# Patient Record
Sex: FEMALE | Race: WHITE | NOT HISPANIC OR LATINO | Employment: OTHER | URBAN - METROPOLITAN AREA
[De-identification: names, ages, dates, MRNs, and addresses within clinical notes are randomized per-mention and may not be internally consistent; named-entity substitution may affect disease eponyms.]

---

## 2023-01-23 ENCOUNTER — TELEPHONE (OUTPATIENT)
Dept: HEALTH INFORMATION MANAGEMENT | Facility: OTHER | Age: 58
End: 2023-01-23
Payer: COMMERCIAL

## 2023-04-24 ENCOUNTER — HOSPITAL ENCOUNTER (OUTPATIENT)
Facility: MEDICAL CENTER | Age: 58
End: 2023-04-24
Attending: PAIN MEDICINE | Admitting: PAIN MEDICINE
Payer: COMMERCIAL

## 2023-05-03 ENCOUNTER — APPOINTMENT (OUTPATIENT)
Dept: ADMISSIONS | Facility: MEDICAL CENTER | Age: 58
End: 2023-05-03
Attending: PAIN MEDICINE
Payer: COMMERCIAL

## 2023-05-05 ENCOUNTER — PRE-ADMISSION TESTING (OUTPATIENT)
Dept: ADMISSIONS | Facility: MEDICAL CENTER | Age: 58
End: 2023-05-05
Attending: PAIN MEDICINE
Payer: COMMERCIAL

## 2023-05-05 VITALS — HEIGHT: 64 IN | BODY MASS INDEX: 40.85 KG/M2

## 2023-05-05 DIAGNOSIS — Z01.810 PRE-OPERATIVE CARDIOVASCULAR EXAMINATION: ICD-10-CM

## 2023-05-05 LAB — EKG IMPRESSION: NORMAL

## 2023-05-05 PROCEDURE — 83036 HEMOGLOBIN GLYCOSYLATED A1C: CPT

## 2023-05-05 PROCEDURE — 93010 ELECTROCARDIOGRAM REPORT: CPT | Performed by: INTERNAL MEDICINE

## 2023-05-05 PROCEDURE — 36415 COLL VENOUS BLD VENIPUNCTURE: CPT

## 2023-05-05 PROCEDURE — 93005 ELECTROCARDIOGRAM TRACING: CPT

## 2023-05-05 RX ORDER — ATORVASTATIN CALCIUM 40 MG/1
40 TABLET, FILM COATED ORAL
COMMUNITY
Start: 2023-02-09

## 2023-05-05 RX ORDER — VENLAFAXINE HYDROCHLORIDE 75 MG/1
1 CAPSULE, EXTENDED RELEASE ORAL
COMMUNITY

## 2023-05-05 RX ORDER — METHYLPREDNISOLONE 4 MG/1
TABLET ORAL
COMMUNITY
Start: 2022-11-17 | End: 2023-06-16

## 2023-05-05 RX ORDER — GABAPENTIN 300 MG/1
300 CAPSULE ORAL
COMMUNITY

## 2023-05-05 RX ORDER — OMEPRAZOLE 20 MG/1
CAPSULE, DELAYED RELEASE ORAL
COMMUNITY

## 2023-05-05 RX ORDER — ALBUTEROL SULFATE 2.5 MG/3ML
SOLUTION RESPIRATORY (INHALATION)
COMMUNITY
Start: 2022-11-22

## 2023-05-05 RX ORDER — ALPRAZOLAM 0.5 MG/1
0.5 TABLET ORAL
COMMUNITY
Start: 2023-04-08

## 2023-05-05 RX ORDER — METFORMIN HYDROCHLORIDE 500 MG/1
TABLET, EXTENDED RELEASE ORAL
COMMUNITY

## 2023-05-05 RX ORDER — LUMATEPERONE 42 MG/1
CAPSULE ORAL
COMMUNITY

## 2023-05-05 RX ORDER — PREDNISONE 10 MG/1
TABLET ORAL
COMMUNITY
Start: 2022-11-22 | End: 2023-06-16

## 2023-05-05 RX ORDER — LAMOTRIGINE 150 MG/1
TABLET ORAL
COMMUNITY

## 2023-05-05 RX ORDER — IBUPROFEN 600 MG/1
TABLET ORAL
COMMUNITY
End: 2023-06-16

## 2023-05-05 NOTE — PREPROCEDURE INSTRUCTIONS
Pre-admit telephone appointment completed. Pt states all instructions given are understood. Medications the patient will take the morning of surgery per anesthesia protocol:  Gabapentin, Lamictal, Montelukast, Prilosec, Venlafaxine, Zyrtec. Pt given instructions for other prescribed medications per protocol.    Denies anesthesia complications  Will bring CPAP

## 2023-05-06 LAB
EST. AVERAGE GLUCOSE BLD GHB EST-MCNC: 143 MG/DL
HBA1C MFR BLD: 6.6 % (ref 4–5.6)

## 2023-05-06 NOTE — OR NURSING
Abnormal unconfirmed ekg, called pt and she denies any cardiac symptoms, no chest pain, no chest pressure, numbness, tingling.  Pt aware if she did have cardiac symptoms to go to the ER and she verbalizes understanding

## 2023-06-08 ENCOUNTER — TELEPHONE (OUTPATIENT)
Dept: NEUROLOGY | Facility: MEDICAL CENTER | Age: 58
End: 2023-06-08
Payer: COMMERCIAL

## 2023-06-08 NOTE — TELEPHONE ENCOUNTER
NEUROLOGY PATIENT PRE-VISIT PLANNING     Patient was NOT contacted to complete PVP.  Note: Patient will not be contacted if there is no indication to call.     Patient Appointment is scheduled as: New Patient     Is visit type and length scheduled correctly? yes    [employer] Patient is checked in Patient Demographics? Yes    3.   Is referral attached to visit? Yes    4. Were records received from referring provider? Yes    4. Patient was NOT contacted to have someone accompany them to visit.     5. Is this appointment scheduled as a Hospital Follow-Up?  No    6. Does the patient require any pre procedure or post procedure follow up? No    7. If any orders were placed at last visit or intended to be done for this visit do we have Results/Consult Notes? No  Labs - Labs were not ordered at last office visit.  Imaging - Imaging was not ordered at last office visit.  Referrals - No referrals were ordered at last office visit.  Note: If patient appointment is for lab or imaging review and patient did not complete the studies, check with provider if OK to reschedule patient until completed.    8. If patient appointment is for Botox - is order pended for provider? N/A

## 2023-06-16 ENCOUNTER — OFFICE VISIT (OUTPATIENT)
Dept: NEUROLOGY | Facility: MEDICAL CENTER | Age: 58
End: 2023-06-16
Attending: PSYCHIATRY & NEUROLOGY
Payer: COMMERCIAL

## 2023-06-16 VITALS
HEART RATE: 87 BPM | HEIGHT: 64 IN | OXYGEN SATURATION: 92 % | RESPIRATION RATE: 18 BRPM | TEMPERATURE: 97.2 F | DIASTOLIC BLOOD PRESSURE: 72 MMHG | BODY MASS INDEX: 37.71 KG/M2 | SYSTOLIC BLOOD PRESSURE: 128 MMHG | WEIGHT: 220.9 LBS

## 2023-06-16 DIAGNOSIS — E66.9 OBESITY (BMI 30-39.9): ICD-10-CM

## 2023-06-16 DIAGNOSIS — G47.33 OBSTRUCTIVE SLEEP APNEA: ICD-10-CM

## 2023-06-16 DIAGNOSIS — G31.84 AMNESTIC MCI (MILD COGNITIVE IMPAIRMENT WITH MEMORY LOSS): Primary | ICD-10-CM

## 2023-06-16 PROCEDURE — 3074F SYST BP LT 130 MM HG: CPT | Performed by: PSYCHIATRY & NEUROLOGY

## 2023-06-16 PROCEDURE — 99202 OFFICE O/P NEW SF 15 MIN: CPT | Performed by: PSYCHIATRY & NEUROLOGY

## 2023-06-16 PROCEDURE — 3078F DIAST BP <80 MM HG: CPT | Performed by: PSYCHIATRY & NEUROLOGY

## 2023-06-16 PROCEDURE — 99205 OFFICE O/P NEW HI 60 MIN: CPT | Performed by: PSYCHIATRY & NEUROLOGY

## 2023-06-16 ASSESSMENT — PATIENT HEALTH QUESTIONNAIRE - PHQ9: CLINICAL INTERPRETATION OF PHQ2 SCORE: 0

## 2023-06-16 NOTE — PROGRESS NOTES
"Reason for Neurology Consult: chronic memory disturbance(s)    History of present illness:    Jayashree Helton 57 y.o. right handed woman who is from [facility]. She graduated from  and nearly 2 years of college classes. She works to for the [place] [employer]> . She was there for nearly 9 months or so.    Problem List reviewed.    She noticed for the last 4-5 years to have difficulty with short term memory. She was asking questions repetitively at work and she recalls having to ask for further clarity within 5-10 minutes and occurring about 3-4 times per week. She recalled asking a question and would not remember she asked the question ( about 1 day later).  Over the last 1.5 years the above gotten worse to the point she has difficulty remembering conversations with her  or her boss. She has noticed that it's harder to spell certain words (random words)> when typing up a power point. She will have to use Paty to say \"How to spell X word.\" (Both simple and complex).    Reading- sometimes has problems with words and she can read a book 4-5 times and not remember what she read within what she read. This can include even watching TV shows or movies.    Her  gave additional information about 20 years ago and had an asthma attack and clinically coded (for over 30 minutes when in the hospital)  and she was in a mentally induced coma had minor memory issues over the next years- conversations discussed were not retained completely or incompletely after mainly.  Over the last 4-5 years he was agreeing that Bárbar[place] would repeat comments after told to her within 10 minutes and occurring 2-3 times per week.   He has noticed that she can repeat herself multiple times within 30 minutes.     No significant misplacing of items (personal).  She is able to use her I phone, computer, and electronic devices at home.   She is able to manipulate computer screens in " the last 6-12 months.    No involuntary movements of body or head in the last 6 months.    Intellectual ability- reasoning and judgement seems good to her.    No paranoia,delusions or hallucinations in the last 6-12 months.    Average sleep- 7-8 hours most nights in last 3-6 months. No REM Sleep Behavioral symptoms reported or commented upon.    No history of seizure(s),stroke event(s) or concussive events in adult life.    Driving has been unaffected per patient and  in the last 6-12 months without accidents or incidents.      Smoked to 1/2 to 1 pack per day (x 6 years total)- over 20 years ago.  Infrequently alcohol use in adult life.    Family Hx: Adopted.      Patient Active Problem List    Diagnosis Date Noted    Asthma 2014    Depression 2014    Dehydration 2014    Leukocytosis 2014    Nausea vomiting and diarrhea 2014       Past medical history:   Past Medical History:   Diagnosis Date    ASTHMA     Cataract 2023    Depression     Diabetes (HCC) 2017    Hiatus hernia syndrome 2015    Sleep apnea 2015    Snoring        Past surgical history:   Past Surgical History:   Procedure Laterality Date    GYN SURGERY          OTHER      tonsilectomy    OTHER      anal surgery    OTHER ABDOMINAL SURGERY      GI surgery for GERD         Social history:   Social History     Socioeconomic History    Marital status:      Spouse name: Not on file    Number of children: Not on file    Years of education: Not on file    Highest education level: Not on file   Occupational History    Not on file   Tobacco Use    Smoking status: Former     Packs/day: 1.00     Years: 5.00     Pack years: 5.00     Types: Cigarettes     Start date: 1980     Quit date: 1985     Years since quittin.5    Smokeless tobacco: Not on file   Vaping Use    Vaping Use: Never used   Substance and Sexual Activity    Alcohol use: Yes     Alcohol/week: 0.6 oz     Types: 1 Standard  "drinks or equivalent per week     Comment: Drink only once in awhile    Drug use: Not Currently     Types: Inhaled    Sexual activity: Not on file   Other Topics Concern    Not on file   Social History Narrative    Not on file     Social Determinants of Health     Financial Resource Strain: Not on file   Food Insecurity: Not on file   Transportation Needs: Not on file   Physical Activity: Not on file   Stress: Not on file   Social Connections: Not on file   Intimate Partner Violence: Not on file   Housing Stability: Not on file       Family history:   No family history on file.      Current medications:   Current Outpatient Medications   Medication    albuterol (PROVENTIL) 2.5mg/3ml Nebu Soln solution for nebulization    ALPRAZolam (XANAX) 0.5 MG Tab    atorvastatin (LIPITOR) 40 MG Tab    gabapentin (NEURONTIN) 300 MG Cap    lamotrigine (LAMICTAL) 150 MG tablet    Lumateperone Tosylate (CAPLYTA) 42 MG Cap    metFORMIN ER (GLUCOPHAGE XR) 500 MG TABLET SR 24 HR    omeprazole (PRILOSEC) 20 MG delayed-release capsule    venlafaxine XR (EFFEXOR XR) 75 MG CAPSULE SR 24 HR    ondansetron (ZOFRAN ODT) 4 MG TBDP    montelukast (SINGULAIR) 10 MG TABS    ZYRTEC PO    ibuprofen (MOTRIN) 600 MG Tab    methylPREDNISolone (MEDROL DOSEPAK) 4 MG Tablet Therapy Pack    predniSONE (DELTASONE) 10 MG Tab    meloxicam (MOBIC) 15 MG tablet     No current facility-administered medications for this visit.       Medication Allergy:  Allergies   Allergen Reactions    Acetaminophen     Ativan     Codeine     Theophylline     Vicodin [Hydrocodone-Acetaminophen] Vomiting    Latex Rash           Physical examination:   Vitals:    06/16/23 0751   BP: 128/72   BP Location: Right arm   Patient Position: Sitting   BP Cuff Size: Adult   Pulse: 87   Resp: 18   Temp: 36.2 °C (97.2 °F)   TempSrc: Temporal   SpO2: 92%   Weight: 100 kg (220 lb 14.4 oz)   Height: 1.626 m (5' 4\")       Normal cephalic atraumatic.  There is full range of movement around the " neck in all directions without restrictions or discrete pain evoked triggers.  No lower extremity edema.      Neurological  Exam:      Fahad Cognitive Assessment (MOCA) Version 7.1    Years of Education: 1 year college    TOTAL SCORE: 25/30  (to be scanned into the MEDIA section in the E.M.R.)          Mental status: Awake, alert and fully oriented to person, place, time, and situation. Normal attention, concentration, and fund of knowledge for education level.  Did not appear/act combative,irritable,anxious,paranoid/delusional or aggressive to or with me.    Speech and language: Speech is fluent without errors, clear, intact to repetition, and intact to naming.     Follows 3 step motor commands in sequence without significant delay and correctly.    Cranial nerve exam:  II: Pupils are equally round and reactive to light. Visual fields are intact by confrontation.  III, IV, VI: EOMI, no diplopia, no ptosis.  V: Sensation to light touch is normal over V1-3 distributions bilaterally.  .  VII: Facial movements are symmetrical. There is no facial droop. .  VIII: Hearing intact to soft speech and finger rub bilaterally  IX: Palate elevates symmetrically, uvula is midline. Dysarthria is not present.  XI: Shoulder shrug are symmetrical and strong.   XII: Tongue protrudes midline.      Motor exam:  Muscle tone is normal in all 4 limbs. and No abnormal movements appreciated.    Muscle strength:    Neck Flexors/Extensors: 5/5       Right  Left  Deltoid   5/5  5/5      Biceps   5/5  5/5  Triceps              5/5  5/5   Wrist extensors 5/5  5/5  Wrist flexors  5/5  5/5     5/5  5/5  Interossei  5/5  5/5  Thenar (APB)  5/5  5/5   Hip flexors  5/5  5/5  Quadriceps  5/5  5/5    Hamstrings  5/5  5/5  Dorsiflexors  5/5  5/5  Plantarflexors  5/5  5/5  Toe extension  5/5  5/5      Sensory exam:    Vibratory- 8 seconds at great toes, 12 seconds at the ankles bilaterally.  Proprioception: Normal at great toes.      Reflexes:   "     Right  Left  Biceps   2/4  2/4  Triceps              2/4  2/4  Brachioradialis             2/4  2/4  Knee jerk  2/4  2/4  Ankle jerk  2/4  2/4     Frontal release signs are absent    bilaterally toes are downgoing to plantar stimulation..    Coordination (finger-to-nose, heel/knee/shin, rapid alternating movements) was normal.     There was no ataxia, no tremors, and no dysmetria.     Station and gait were normal. Easily stands up from exam chair without retropulsion,veering,leaning,swaying (to either side).       Labs and Tests:     NEUROIMAGING:     Component Ref Range & Units 1 mo ago   Glycohemoglobin 4.0 - 5.6 % 6.6 High     Comment: Increased risk for diabetes:  5.7 -6.4%   Diabetes:  >6.4%   Glycemic control for adults with diabetes:  <7.0%     The above interpretations are per ADA guidelines.  Diagnosis   of diabetes mellitus on the basis of elevated Hemoglobin A1c   should be confirmed by repeating the Hb A1c test.    Est Avg Glucose mg/dL 143    Comment: The eAG calculation is based on the A1c-Derived Daily Glucose   (ADAG) study.  See the ADA's website for additional information.    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    No features of a progressive gait disorder/parkinsonism.    No psychotic or psychiatric features to this presentation.    There is no history of autoimmune disorders or discrete cerebrovascular risk factors, HIV risk factors or significant alcohol use.        Today, I reviewed the clinical criteria and reviewed several  scenarios of the differences being using the medical terms of normal brain aging (age associated memory impairment),  Mild Cognitive Impairment (MCI) and Dementia.    MCI is a syndrome but statistically and for the majority of patients  occurs due  to a more rapid " "aging of the brain tissue or potentially from injury to certain parts of one's brain ( often from such contributing factors as  the cumulative effects of alcohol, from one or more ischemic or hemmorhagic stroke(s), from neurodegeneration or long standing with/without suboptimally controlled Hypertension). It is for some of these potential factors as to why I recommend a brain imaging test (Head CT or Brain MRI) be done for the 1st time or in certain circumstances repeated for comparison purposes  as such imaging can suggest one or more factors as to the reason(s) for the person's cognitive/memory changes. In fact, a normal or \"age related\" finding on a brain imaging test in and of itself is useful clinical and objective information to have in the evaluation of a person who has either an acute, evolving or even chronic (months to years) long cognitive/memory complaint.    Additional factors or contributors to Brain Health issues can be summarized in several books/references which I discussed as well today.     Goals going forwards include:    A. Paying attention to one's risk factors and reducing their prevalence or potential impact on one's changing memory/thinking> an excellent example would be to stop smoking, reduce or eliminate alcohol use (depending on the amount and frequency of usage), maintain good blood pressure control by buying a digital arm blood pressure cuff such as an OMRON Series 3 or 5 and checking one's blood pressure atleast 3 days per week (in the am and early afternoon) that the numbers are under 140/90 and working as needed with the primary care doctor  to optimize blood pressure control).      B. Encouraging proper sleep hygiene which for most adults is 7 to 8 hours of uninterrupted sleep per night.      C. Encouraging some form of exercise preferable aerobic forms to be done (4 to 5 days per week- 30 minutes minimum per day)> 150 minutes per week as a goal. Example activities could include " fast walking (up a slight incline), jogging, cycling (road or stationary), swimming,tennis. Essentially, even basic walking on a flat surface or a treadmill would be better than doing nothing.    D. Maintaining or forming new social contacts with family and friends  and a positive attitude despite the concerns and/or ongoing issues with thinking and/or memory.    E. Eating well which means a diet low in salt  (under 2 grams per day), sugar and saturated fat.    G. Importance of Diet and Exercise encouraged today including MIND Diet.    H. I am keeping up with editorials and  comments from  many academic neurologists throughout the [place] who are writing reviews and summaries of the present state of this provisionally approved anti amyloid compound (aducanumab) and Leqembi.      Based on the critique of the data so far,  there are clear risks of using these compounds including the cumulative risks of microfibrosis of the cortical brain tissue from the effects of the inflammatory removal of amyloid , the risks of potentially giving or needing to get an acute clot busting medication (like Teneceplase) to treat an acute ischemic stroke in evolution and the longer term risks of spontaneous brain bleeds and brain swelling (some of which can be life threatening events).    I have discussed with the patient and family today that given  the great controversy about the study's data and analysis of the lack of clinical  efficacy to this point in time, I feel that I need to wait and see reasonable post marketing data and/or more robust efficacy data supported by the academic community and/or the American Academy of Neurology  before making a commitment to prescribe such a compound(s)  and  where there is more than  a minor/minimal amount of risk to the patient involved in being administered this compound on a chronic (monthly) basis.      I.  Blood work to be done (Vitamin B levels, Vitamin D levels.    J Brain MRI to be done; see  no reason for immediate Brain PET at this time which we discussed.  See no reason for CSF study or EEG at this time.    K. Encouraged CPAP usage in the long term for EDWARD.    L. Encouraged weight reduction to under BMI of 30.0    I have performed  a history and physical exam and a directed /focused  ROS today.    Total time spent today or this patient's care was 68 minutes  and included reviewing  the diagnostic workup to date (such as labs and imaging as well as interpreting such tests relevant to this patient's neurological condition),  reviewing/obtaining separately obtained history (from patient and/or accompanying ffamily member)  for today's neurological problem(s) ,counseling and educating the patient and family member on issues related to cognition/memory and cognitive health factors and documenting  the clinical information in the EMR.    Follow up in about 6 months or so.        Rafal Sanchez MD  [place] [facility].   [facility]

## 2023-12-15 ENCOUNTER — APPOINTMENT (OUTPATIENT)
Dept: NEUROLOGY | Facility: MEDICAL CENTER | Age: 58
End: 2023-12-15
Attending: PSYCHIATRY & NEUROLOGY
Payer: COMMERCIAL

## 2024-08-01 ENCOUNTER — APPOINTMENT (OUTPATIENT)
Dept: NEUROLOGY | Facility: MEDICAL CENTER | Age: 59
End: 2024-08-01
Attending: CLINICAL NEUROPSYCHOLOGIST

## 2024-09-13 ENCOUNTER — TELEPHONE (OUTPATIENT)
Dept: HEALTH INFORMATION MANAGEMENT | Facility: OTHER | Age: 59
End: 2024-09-13
Payer: COMMERCIAL

## 2024-09-16 ENCOUNTER — APPOINTMENT (OUTPATIENT)
Dept: NEUROLOGY | Facility: MEDICAL CENTER | Age: 59
End: 2024-09-16
Payer: COMMERCIAL

## 2025-04-25 ENCOUNTER — APPOINTMENT (OUTPATIENT)
Dept: BEHAVIORAL HEALTH | Facility: CLINIC | Age: 60
End: 2025-04-25

## 2025-04-25 NOTE — PROGRESS NOTES
"CONFIDENTIAL NEUROPSYCHOLOGICAL EVALUATION REPORT    Patient name: Jayashree Helton  Referral source: Rafal Sanchez M.D.   MRN: [id number]  Evaluation date: 4/25/2025   YOB: 1965  Neuropsychologist: Jenna Herrera, Ph.D.         This evaluation was conducted for clinical treatment planning and may not be valid for other purposes. Potential risks and benefits, limits of confidentiality, and test procedures were discussed. Following this discussion, the patient consented to complete the evaluation. Information for this report was obtained from the medical record, neuropsychological testing, and a clinical interview with the patient and her  (Mr. Braden Helton) conducted on 04/25/2025.    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 Her neurologist, Dr. Sanchez, referred the patient for a neuropsychological evaluation to characterize her cognitive concerns, aid in differential diagnosis, and treatment planning. Additional medical history is relevant for    Previous Studies: Neurological exam (06/16/2023) was remarkable for mild difficulties during cognitive screening. Cognitive screener (06/16/2023) was slightly below expectation (Fahad Cognitive Assessment; MOCA = 25/30). Points were lost for cube copy (-1), clock drawing (-1), and short delay word recall (-3). MRI of the brain (8/28/2023) documented: \" Midline structures are intact.  There is no Chiari I malformation.  The corpus callosum is present throughout its entirety.  There is no restricted diffusion. There is nonspecific rounded T2-FLAIR signal abnormality in the left frontal corona radiata, unchanged.  Gray-white matter junctions are preserved. There is no intra-axial mass " "or vasogenic edema.  There is no extra axial fluid collections, midline shift or mass effect.  The basal cisterns are patent.\" \" Impression: No acute intracranial abnormality or space-occupying lesion, essentially unchanged compared to the prior MRI of the brain on 5/24/2019.\" CT of the brain (02/06/2022) revealed the brain parenchyma is normal with no hemorrhage or mass.  The ventricles and CSF spaces are normal.  There are no abnormal areas of enhancement on the postcontrast images.  The visualized sinuses and mastoid air cells are clear.  No fracture or destructive bony lesion are present.\"  CT angiogram of the cerebral arteries (02/06/2022) documented: \" Subtle nodularity is seen left A1 segment of the middle cerebral artery is visualized axial postcontrast images raising the possibility of an underlying vasculitis such as Takayasu's arteritis or autoimmune vascularity of other origin.  No large aneurysm is seen.  No thrombus is noted.  The superior sagittal sinus and transverse sinuses are patent.  CT angiogram of the carotid arteries (02/06/2022) revealed: \" Narrowing of the right proximal to the mid common carotids present at 50-60% seen axial image 52 series 13.\"  MRA of the cerebral arteries (02/07/2022) documented: \" No large aneurysm or occlusion artery appreciated.  Very subtle irregularity M1 segment left middle cerebral artery possibly from inflammation from vasculitis.\"  APO E genotyping (7/31/2023) was E3/E3: Negative for the APO E4 variant there is associated with increased risk for late onset Alzheimer's disease. E3/E3 is the most common APO E genotype and is not associated with increased risk for AD. Relevant lab results EEG (7/30/2023) revealed low vitamin D 25-hydroxy level.      Presenting Concerns:     Cognitive Functioning: The patient and his/her wife/ reported s/he has been experiencing progressive cognitive decline for the past nine years.   Examples of his/her current concerns " included     Functional Abilities: The patient reported s/he is independent and fully capable in all self-care and instrumental activities of daily living (ADLs) including medication management, finances, scheduling appointments, household responsibilities, and driving (no recent tickets, accidents, or confusion).  The patient and his wife reported he is independent in all basic activities of daily living (ADLs).     Emotional Functioning: The patient described his/her current mood as “” Current stressors include   S/he denied loss of interest, persistent sadness, anhedonia, suicidal ideation, decreased energy, appetite changes, sleep problems, traumatic stress related symptoms, and anxiety.   There were no reports of manic symptoms, social withdrawal, personality changes, acting out dreams, hallucinations, or delusions.      Sensory/Physical/Motor Changes: The patient denied changes in sensory functioning. The Patient denied changes in vision, hearing, smell, or taste.   She noted migraines, chronic pain, balance problems, falls within the past 6 months  she denied fine or gross motor problems.  The Patient denied recent changes or difficulties in motor control or mobility, including a history of recent falls.  Current home modifications include.   S/He uses a walker to aid with ambulation.     Medical History: Per her medical record, it includes MCI, asthma, cataracts, diabetes, hiatus hernia syndrome, obstructive sleep apnea (EDWARD, uses CPAP nightly), dehydration, leukocytosis, nausea vomiting and diarrhea, and asthma      There is no reported history of known seizures, strokes, or traumatic brain injuries.      Surgical history includes , tonsillectomy, ankle surgery, and GI surgery for GERD.Hospitalizations within the past month were denied.    Mental Health History: Psychiatric history is unremarkable. She denied a history of mental health treatment or diagnosis, including treatment with a  psychiatrist, counseling, and psychiatric hospitalizations.     Family Medical History: Remarkable for asthma in her father. The patient stated she was adopted and does not know anymore about her family medical history.    Medications and Supplements: Lamotrigine, lumateperone tosilate, omeprazole, venlafaxine XR, baclofen, pregabalin, albuterol, fluticasone-umeclidine-vilant, alprazolam, atorvastatin, montelukast, ondansetron, prednisone, triamcinolone acetonide, vitamin D2, docusate sodium, estradiol-norethindrone patch, and Zyrtec.     Psychosocial History:  reports that she quit smoking about 39 years ago. Her smoking use included cigarettes. She started smoking about 45 years ago. She has a 5.9 pack-year smoking history. She does not have any smokeless tobacco history on file. She reports current alcohol use of about 0.6 oz of alcohol per week. She reports that she does not currently use drugs after having used the following drugs: Inhaled.     Social History     Tobacco Use    Smoking status: Former     Current packs/day: 0.00     Average packs/day: 1 pack/day for 5.9 years (5.9 ttl pk-yrs)     Types: Cigarettes     Start date: 1980     Quit date: 1985     Years since quittin.4    Smokeless tobacco: Not on file   Vaping Use    Vaping status: Never Used   Substance and Sexual Activity    Alcohol use: Yes     Alcohol/week: 0.6 oz     Types: 1 Standard drinks or equivalent per week     Comment: Drink only once in awhile    Drug use: Not Currently     Types: Inhaled    Sexual activity: Not on file       Social Drivers of Health     Alcohol Use: Not At Risk (2024)    Received from [facility]    AUDIT-C     Frequency of Alcohol Consumption: 2-4 times a month     Average Number of Drinks: 1 or 2     Frequency of Binge Drinking: Never   Depression: Not at risk (2025)    PHQ-2     PHQ-2 Score: 0   Financial Resource Strain: Patient Declined (2024)    Received from [facility]     Overall Financial Resource Strain (CARDIA)     Difficulty of Paying Living Expenses: Patient declined   Food Insecurity: No Food Insecurity (8/12/2024)    Received from [facility]    Hunger Vital Sign     Worried About Running Out of Food in the Last Year: Never true     Ran Out of Food in the Last Year: Never true   Housing Stability: Not on file   Intimate Partner Violence: Not At Risk (8/12/2024)    Received from [facility]    Humiliation, Afraid, Rape, and Kick questionnaire     Fear of Current or Ex-Partner: No     Emotionally Abused: No     Physically Abused: No     Sexually Abused: No   Physical Activity: Sufficiently Active (8/12/2024)    Received from [facility]    Exercise Vital Sign     Days of Exercise per Week: 5 days     Minutes of Exercise per Session: 30 min   Social Connections: Unknown (8/12/2024)    Received from [facility]    Social Connection and Isolation Panel [NHANES]     Frequency of Communication with Friends and Family: Patient declined     Frequency of Social Gatherings with Friends and Family: Patient declined     Attends Rastafari Services: Patient declined     Active Member of Clubs or Organizations: Patient declined     Attends Club or Organization Meetings: Patient declined     Marital Status:    Stress: No Stress Concern Present (8/12/2024)    Received from [facility]    [place] [employer] Occup[employer] - Occupational Stress Questionnaire     Feeling of Stress : Not at all   Tobacco Use: Medium Risk (2/11/2025)    Received from [facility]    Patient History     Smoking Tobacco Use: Former     Smokeless Tobacco Use: Never     Passive Exposure: Past   Transportation Needs: No Transportation Needs (8/12/2024)    Received from [facility]    PRAPARE - Transportation     Lack of Transportation (Medical): No     Lack of Transportation (Non-Medical): No   Utilities: Not At Risk (8/12/2024)    Received from [facility]     Utilities     Threatened with loss of utilities: No     The patient was born in [place] raised in. S/He completed of formal education. S/He also obtained a degree in. S/He denied a history of learning or academic difficulties. 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 There is no pending litigation. S/He is  and has five children. S/He currently lives with her and in a private residence. S/He reported good social support from family and friends. Hobbies and activities include.     Substance Use: The patient reported she drinks an average of one beer per month. S/He denied current use of illicit drugs, marijuana, and nicotine products. There is no reported history of substance use disorder or treatment.     denied current use of alcohol, illicit  drugs, marijuana, and nicotine products. There is no reported history of substance use disorder or treatment.     Measures Administered: [place] Naming Test (BNT); Brief Visuospatial Memory Test - Revised (BVMT-R);  [place] Verbal Learning Test-3rd. Ed. (CVLT-3); Category Fluency (Animals); Controlled Oral Word Association Test (COWAT FAS); Generalized Anxiety Disorder 7 Item Scale (NASREEN-7); Mims Verbal Learning Test - Revised (HVLT-R); Mini-Mental State Examination - 2nd Ed. Orientation (MMSE-2); Neuropsychological Assessment Battery (NAB) Form 1: Numbers and Letters (N & L); Patient Health Questionnaire (PHQ-9); Performance Validity Measures; Personality Assessment Inventory (RYAN); Repeatable Battery for the Assessment of Neuropsychological Status Line Orientation (RBANS LO); Emil-Osterrieth Complex Figure Test - Copy Trial (RCFT-CT); Stroop Color and Word Test (Stroop); Test of Premorbid Functioning (TOPF); Trail Making Test A & B (TMT); Wechsler Adult Intelligence Scale - 4th Ed. (WAIS-IV): Block Design (BD), Digit Span (DS), Arithmetic (AR), Matrix Reasoning (MR), Similarities (SI), Vocabulary (VC), Symbol Search (SS), & Coding (CD); Wechsler Memory Scale -  4th Ed. Logical Memory (WMS-IV LM); and [place] Card Sorting Test - 64 Card Version (WCST-64). Informant Questionnaires: Activities of Daily Living Questionnaire (ADLQ) and Neuropsychiatric Inventory Questionnaire (NPI-Q).    Behavioral Observations: The patient arrived at the clinic on time and was accompanied by her , who participated in the clinical interview. She was well groomed and dressed. She was alert and fully oriented to situation, person, place, and time (MMSE-2 Orientation = 10/10). She was polite and always maintained appropriate interpersonal boundaries. Rapport was easily established. Gait was unremarkable. No gross abnormal movements or motor symptoms were observed. Speech rate, volume, articulation, and prosody were normal. Speech content was logical and appropriate to context. Expressive and receptive language were intact during conversation. Insight into cognitive and emotional functioning was intact. Mood was euthymic during the appointment. Affect was mood-congruent and appropriate to the situation. There was no indication of hallucinations, delusions, or thought disorder. Vision and hearing were adequate for the evaluation. She was attentive throughout the evaluation and had no difficulties with retention of task demands. Response style was unremarkable. Overall, she was engaged and cooperative throughout testing.    Results & Key Findings: Please note that scores reported are for professional use only. For diagnostic purposes, a performance score that falls below the 9th percentile of the reference group for that measure may be considered a cognitive deficit depending on the overall pattern of performance. The following clinical descriptors identify performance within the range of percentile scores indicated in the parentheses: Exceptionally High Score (>98th), Above Average Score (91st-97th), High Average Score (75th-90th), Average Score (25th-74th), Low Average Score (9th-24th),  Below Average Score (2nd-8th), and Exceptionally Low Score (<2nd). Please see the attached test results summary table in the appendix for a list of measures administered as well as raw and normative scores, which are listed in the designated columns. All such scores are based on age-corrected norms and certain scores may be adjusted for education and/or other demographic factors as appropriate.     Data Validity: The patient's performance on embedded and standalone validity measures was within the valid range, suggesting she appropriately engaged in testing. The following test results are considered an accurate representation of her current level of cognitive functioning.    Premorbid and Intellectual Functioning: A predicted estimate of premorbid intellectual functioning based on age and her performance on a single word-reading test fell within the average range (TOPF). Based on demographic factors, her premorbid ability was estimated in the high average range (TOPF). She was administered subtests from a measure of general intellectual functioning (WAIS-IV), yielding a prorated Full-Scale IQ (FSIQ) of 101, which is in the average range compared to similarly aged peers, slightly below her estimated premorbid ability based on word reading. Among underlying abilities (index scores), verbal comprehension/reasoning (prorated VCI = 100), visuospatial/perceptual reasoning (prorated JAMIE = 109), working memory (WMI = 92), and processing speed (PSI = 100) were all in the average range compared to similarly aged peers. Based on normative base rates, there was a clinically meaningful discrepancy between her JAMIE and WMI, suggesting relatively weaker working memory. There was another discrepancy among index scores (VCI & JAMIE) that was not unusual based on available base rates. Her general ability index (GAI = 104), which excludes the PSI and WMI, was also in the average range compared to similarly aged peers.There were no other  clinically meaningful discrepancies among index scores.    Cognitive Functioning:     The patient's performance on some measures in the following domains was below expectation:    Attention/Working Memory: Basic auditory attention span (forward number repetition) and working memory (backward/sequenced number repetition; WAIS-IV DS). Visual working memory (NAB Dots).  Visuospatial Processing: Judgment of line orientation (RBANS LO), simple figure copy (BVMT-R Copy), clock copy (CLOX 2), abstract/deductive visual reasoning (WAIS-IV MR), and construction of block designs (WAIS-IV BD).   Executive Functioning: Semantic verbal set shifting was exceptionally low (DKEFS VF). Response inhibition with a set shifting component was exceptionally low due to numerous errors (DKEFS CWI). Response inhibition was low average (DKEFS CWI). Practical judgment in response to hypothetical scenarios (TOPJ), visuomotor set shifting (TMT B), and abstract verbal reasoning (WAIS-IV SI) were all average. Freehand clock drawing/conceptualization (CLOX 1) and abstract/deductive visual reasoning (WAIS-IV MR) were both high average. Phonemic verbal fluency was above average (DKEFS VF).    The patient's performance in the following domains was within to above expectation:     Processing Speed: Rapid color naming (DKEFS CWI), simple word reading speed (DKEFS CWI), visuomotor number sequencing speed (TMT A), and rapid code transcription (WAIS-IV CD).   Language: Visual confrontation naming (NAB Naming), single word reading (TOPF), semantic verbal fluency (DKEFS VF), abstract verbal reasoning (WAIS-IV SI), and general fund of knowledge (WAIS-IV IN).   Memory: Encoding, delayed recall, and recognition of rote verbal information (wordlist; HVLT-R), contextual verbal information (short stories; WMS-IV LM), and visual information (simple figures; BVMT-R).  Memory: Total recall of a wordlist across three repeated learning trials was low average. Delayed  recall remained low average, with 86% retention rate of information. Recognition discrimination of the wordlist was below average, with 10/12 target words correctly identified and 3 false positive errors (HVLT-R). Immediate recall of short stories was average. Delayed recall remained average (63% retention rate). Delayed recognition of story details was also average (WMS LM). Total recall of an array of simple geometric figures across three repeated learning trials was exceptionally low. Delayed recall remained exceptionally low (67% retention rate). Delayed recognition discrimination of the figures was within normal limits, with 6/6 target figures correctly identified and nil false positive errors (BVMT-R).  Executive Functioning: Visuomotor set shifting (TMT B), phonemic verbal fluency (DKEFS VF), semantic verbal set shifting (DKEFS VF), response inhibition (DKEFS CWI), response inhibition with a set shifting component (DKEFS CWI), freehand clock drawing/conceptualization (CLOX 1), abstract verbal reasoning (WAIS-IV SI), abstract/deductive visual reasoning (WAIS-IV MR), and spatial planning/problem solving (DKEFS TT).    Self-Report Questionnaires: The patient's responses on self-report inventories of emotional functioning were indicative of  mild levels of depression (PHQ-9) and minimal levels of anxiety (NASREEN-7) over the past two weeks.    Informant Questionnaires: Her  completed a questionnaire about the patient's ability to function independently, implying a minimal level of impairment in activities of daily living (ADLQ). On a questionnaire regarding neuropsychiatric symptoms, her  reported observing appetite changes (NPI-Q).    Neuropsychological Findings and Impressions    Results Summary/Interpretation: Performance on all the remaining measures across cognitive domains was within normal limits including attention/working memory, processing speed, language, visuospatial processing, and  executive functioning. Abstract verbal reasoning and mental arithmetic were relative cognitive strengths.    Impression:    Recommendations:          Thank you for allowing me to participate in the patient's care. If I can be of further assistance, please do not hesitate to contact me.      Jenna Herrera, Ph.D.          Clinical Neuropsychologist          Behavioral Health Department      [facility]             Appendix:    Test Results Summary Table:        This report was created using voice recognition software. I have made every reasonable attempt to avoid dictation errors, but this document may contain an error not identified before finalizing. If the error changes the accuracy of the document, I would appreciate it being brought to my attention. Thank you.    I spent one hour and *** minutes interviewing the patient and her  (neurobehavioral status exam: 77818 = 1). Four hours and 43 minutes of technician time were spent in test administration and scoring under my supervision (68777 = 1, 08027 = 8). I spent ***hours and *** minutes in clinical decision-making, chart review, records reviews, interpretation of test results and clinical data, integration of patient data, and report preparation (test evaluation services: 85599 = 1, 09831 = 2).   information (short stories; WMS-IV LM), and visual information (simple figures; BVMT-R).    Personality: On a measure assessing personality and emotional functioning (RYAN), validity indicators suggested that the patient generally responded consistently and attentively, without evidence of impression management. Her personality profile was within normal limits across all clinical scales.     Self-Report Questionnaires: The patient's responses on self-report inventories of emotional functioning were indicative of  mild levels of depression (PHQ-9) and minimal levels of anxiety (NASREEN-7) over the past two weeks.    Informant Questionnaires: Her  completed a questionnaire about the patient's ability to function independently, implying a minimal level of impairment in activities of daily living (ADLQ). On a questionnaire regarding neuropsychiatric symptoms, her  reported observing appetite changes (NPI-Q).    Neuropsychological Findings and Impressions    Results Summary/Interpretation: The patient's general intellectual ability was estimated in the low average range, slightly below her predicted premorbid ability. Her performance was below expectation compared to similarly aged peers and relative to her estimated premorbid ability on some measures of auditory working memory (mental arithmetic ability), visuospatial processing (complex figure copy), and executive functioning (novel problem solving/conceptualization). Other aspects of attention/working memory and executive functioning were intact. On the complex figure copy task, she appreciated the gestalt of the figure but utilized a disorganized approach to copy the elements. This was indicative of executive dysfunction interfering with performance. Her performance on all other measures of visuospatial processing was low average, which was relatively weak compared to her other cognitive domains but not below expectation compared to similarly aged peers. Her  performance on all the remaining measures across cognitive domains was within normal limits including language, processing speed, and memory (encoding, consolidation, and retrieval of verbal and visual information). She reported mild symptoms of depression and minimal symptoms of anxiety on self-report questionnaires, largely consistent with her report during the clinical interview. Her personality profile was within normal limits across all clinical scales.    Impression: The patient's cognitive profile reveals impairments in aspects of auditory working memory and executive functioning. In addition, visuospatial processing is weak relative to her other cognitive abilities. Based on her history, cognitive profile, and reported functional status, she continues to meet criteria for mild cognitive impairment (mild neurocognitive disorder). Etiology is likely multifactorial. Possible factors contributing to her cognitive difficulties include low vitamin D level, occasional sleep problems, fatigue, tension headaches, chronic pain, medication effects (baclofen, prednisone, alprazolam, lumateperone, and pregabalin), and her history of possible hypoxia. Her working memory and executive function deficits are indicative of mild frontal-subcortical networks dysfunction, consistent with what can be seen in patients with vascular cognitive impairment. This is corroborated by her medical history of vascular risk factors, but her brain MRI did not show advanced cerebrovascular burden. Mood disturbance related to longstanding BDI and PTSD may be exacerbating her daily cognitive problems, but does not fully account for her deficits, particularly her decline in cognition in recent years. She is followed by psychiatry and symptoms are well controlled with her current medication regimen. Her relative weakness in visuospatial abilities is suggestive of mild right hemisphere hypofunction. However, available neuroimaging findings do not  show right hemisphere abnormalities, and she denied significant visuospatial problems day-to-day. Her overall pattern of performance including intact language and memory is not consistent with early onset Alzheimer's disease. This evaluation may serve as a baseline for longitudinal tracking.      Recommendations:    The patient is scheduled for a feedback session to discuss current results and recommendations on 06/26/2025.  Based on the present results, the patient is recommended for a repeat neuropsychological evaluation in 12 to 18 months or sooner if there is a considerable change in cognitive or emotional status. At that time, diagnostic impressions and treatment recommendations will be revised and updated as necessary. Additional testing will permit comparisons over time to better identify stability, potential improvement, or possible decline.  In light of her cognitive deficits, increased oversight and assistance with complex instrumental activities of daily living (e.g., financial and medication/health management) from a family member or other trusted individual are recommended.  Given her cognitive deficits, increased oversight and assistance from family members or trusted others with complex decision-making (e.g., legal, financial, and medical) are recommended.  Regarding work in the future, a diagnosis of mild cognitive impairment (MCI) is typically not a contraindication to engaging in full-time work. Individuals with MCI are usually able to compensate for their mild cognitive difficulties. Regardless, her mild cognitive deficits can potentially interfere with work performance. As such, depending on the complexity of the job task, the patient may need to use compensation strategies and require reasonable accommodations at work. Examples may include extra time for work related tasks, permission to take more frequent breaks, writing notes, using to do lists, permission to record information during  meetings, and/or using electronic reminders, but these will vary depending on the specific job task. The Job Accommodation Network (JAN; https://askjan.org/index.cfm; 671.487.3379) offer helpful resources in this regard.  Considering her report mild anxiety, mood disturbance, and ongoing stressors, if interested, the patient may benefit from the initiation of individual counseling sessions for further assessment and treatment. Evidence-based treatments such as cognitive behavioral therapy, acceptance and commitment therapy, and mindfulness-based interventions may be particularly beneficial. She preferred to postpone a referral at this time but may reach out to me to place one as needed. The following are options for psychotherapy in the community:  [facility] (https://www.[employer]/health-services/behavioral-health; 419.316.6908 or 025-205-2956)  Blanca Banda, PhD (https://www.HemoShear/; 615.195.9147)  [facility] (https://www.[employer]; 690.280.6885)  [facility] (https://www.Voxeet.baseclick/index.html; 887.655.7461)  [facility] (https://Skimble.baseclick; 168.494.1042)   A directory of providers can also be found on the Psychology Today website (www.psychologytoday.com)  Given her cognitive deficits, if interested, the patient may benefit from engagement in occupational or speech therapy with a cognitive rehabilitation component. She may reach out to me to place a referral as needed and she was added to the waitlist for the cognitive training program in our department.   Given her report of chronic pain and tension headaches that may exacerbate cognitive difficulties, she is encouraged to continue pain and headache management treatment.  Low vitamin D levels have been associated with cognitive inefficiencies day-to-day. As such, she is encouraged to consult with her prescribing physicians regarding possible supplementation or dietary changes.  The  patient reported sleep problems and daytime fatigue that may be contributing to her cognitive difficulties. As such, she may benefit from education regarding sleep hygiene and healthy sleep habits, including maintenance of a regular sleep/wake cycle and integrating relaxation exercises before bed. Considering her history of EDWARD, she is encouraged to follow up with sleep medicine as needed. Additional strategies include:  The light emitted by phone screens, TVs, and iPads can mimic daylight and suppress the body's natural release of melatonin, making it harder to fall asleep. It is recommended to stop screen time about an hour before bed.  Develop a bedtime routine.  Keep the bedroom at a comfortable temperature.  Use low lighting in the evenings.  Avoid consuming large meals and caffeine close to bedtime.  Avoid sleeping during the day, as it can disturb the normal pattern of sleep and wakefulness.  Exercise can promote good sleep, particularly when completed in the morning or early afternoon.  In light of her cognitive deficits, it is recommended that the patient be closely monitored by her family or other trusted individuals for future changes or declines while driving to ensure her safety and that of others on the road. If this becomes an area of concern, it would be advised that she undergo a formal driving evaluation conducted by an occupational therapist with expertise in this area: [facility] (https://www.[facility].org/Health-Services/Physical-Therapy; 742.955.3236). Examples to promote safe driving include having a passenger in car, restricting driving to well-known routes during the day in fair weather with good visibility, limiting external distractions (e.g., radio and cell phone), and avoiding complicated driving situations and highly congested areas.  Continued use of compensatory strategies is recommended to reduce cognitive demands. This may include setting scheduled  routines, having an established location for essential items (e.g., wallet, glasses, and keys), completing tasks when there are no time demands, completing one task at a time, minimizing external distractions, paraphrasing and repeating to be learned information, and using external aids for reminders (e.g., planner, calendar, setting alarms, labels, and to-do lists) consistently.   Energy conservation strategies are recommended to manage mental/physical fatigue and daytime somnolence, such as taking scheduled breaks, breaking down demanding tasks into smaller components, working on projects for shorter periods, maximizing time when she feels the most alert, and working on cognitively demanding projects at her best time of the day.  In light of her medical history, cerebrovascular disease represents a risk factor for future cognitive decline. As such, the patient is encouraged to reduce vascular risk factors (e.g., prediabetes) per her providers' treatment recommendations (e.g., healthy diet, medication adherence, and exercise). The book Undo It!: How Simple Lifestyle Changes Can Reverse Most Chronic Diseases by Alexi Cutler and Kaya Cutler may be a helpful resource for the patient.  Individuals with mild cognitive impairment are at a higher risk of developing dementia in the future. As such, the patient is encouraged to regularly engage in social and physical recreation, as well as cognitively stimulating activities (e.g., puzzles, games, reading, exercise, and social gatherings) to promote brain health and emotional well-being. The book Living with Mild Cognitive Impairment: A Guide to Maximizing Brain Health and Reducing Risk of Dementia by Kailyn Gonzalez may be a helpful resource for the patient. The [facility] [employer] [place] provides diverse educational & social opportunities for adult learners (https://www.olli.[facility].edu; 750.111.8429).  The books Keep Your Wits About You: The Science of Brain  Maintenance as You Age by Kavita Esquivel PhD, Your Memory: How It Works and How to Improve It by Rolan Arevalo, and [employer] Guide to Achieving Optimal Memory by Sebastien Pichardo and Catalina Tay may be helpful resources for the patient and her family.  The patient and her family may benefit from resources available through the Alzheimer's Association (www.alz.org or 9.880.121.4493) and the American Heart Association (https://www.heart.org/ or 9-543-066-4447), which offer psychoeducational information and services for individuals with mild cognitive impairment and vascular risk factors.  If not already done so, the patient and her family members are encouraged to discuss legal and financial affairs, such as establishing a will/trust and power of , to assist her with future financial and healthcare decisions. Information regarding these issues can be found at www.caringinfo.org or www.agingwithdignity.org/5wishes.html.  The patient's family may benefit from information and resources available through the Family Caregiver [place] (www.caregiver.org) and Caring.com (www.caring.com), which include support groups and respite services.      Thank you for allowing me to participate in the patient's care. If I can be of further assistance, please do not hesitate to contact me.      Jenna Herrera, Ph.D.          Clinical Neuropsychologist          Behavioral Health Department      [facility]             Appendix:    Test Results Summary Table:            This report was created using voice recognition software. I have made every reasonable attempt to avoid dictation errors, but this document may contain an error not identified before finalizing. If the error changes the accuracy of the document, I would appreciate it being brought to my attention. Thank you.    I spent one hour and 8 minutes interviewing the patient and her  (neurobehavioral status exam: 81384 = 1). Four hours and 43  minutes of technician time were spent in test administration and scoring under my supervision (39627 = 1, 97717 = 8). I spent 2 hours and 41 minutes in clinical decision-making, chart review, records reviews, interpretation of test results and clinical data, integration of patient data, and report preparation (test evaluation services: 22098 = 1, 76057 = 2).

## 2025-05-14 ASSESSMENT — PATIENT HEALTH QUESTIONNAIRE - PHQ9: CLINICAL INTERPRETATION OF PHQ2 SCORE: 0

## 2025-05-15 ENCOUNTER — APPOINTMENT (OUTPATIENT)
Dept: NEUROLOGY | Facility: MEDICAL CENTER | Age: 60
End: 2025-05-15
Attending: PSYCHIATRY & NEUROLOGY
Payer: COMMERCIAL

## 2025-05-23 ENCOUNTER — APPOINTMENT (OUTPATIENT)
Dept: BEHAVIORAL HEALTH | Facility: CLINIC | Age: 60
End: 2025-05-23

## 2025-06-26 ENCOUNTER — APPOINTMENT (OUTPATIENT)
Dept: BEHAVIORAL HEALTH | Facility: CLINIC | Age: 60
End: 2025-06-26
Payer: COMMERCIAL

## 2025-06-26 NOTE — PROGRESS NOTES
Neuropsychological Feedback   auditory working memory and executive functioning. In addition, visuospatial processing is weak relative to her other cognitive abilities. Based on her history, cognitive profile, and reported functional status, she continues to meet criteria for mild cognitive impairment (mild neurocognitive disorder). Etiology is likely multifactorial. Possible factors contributing to her cognitive difficulties include low vitamin D level, occasional sleep problems, fatigue, tension headaches, chronic pain, medication effects (baclofen, prednisone, alprazolam, lumateperone, and pregabalin), and her history of possible hypoxia. Her working memory and executive function deficits are indicative of mild frontal-subcortical networks dysfunction, consistent with what can be seen in patients with vascular cognitive impairment. This is corroborated by her medical history of vascular risk factors, but her brain MRI did not show advanced cerebrovascular burden. Mood disturbance related to longstanding BDI and PTSD may be exacerbating her daily cognitive problems, but does not fully account for her deficits, particularly her decline in cognition in recent years. She is followed by psychiatry and symptoms are well controlled with her current medication regimen. Her relative weakness in visuospatial abilities is suggestive of mild right hemisphere hypofunction. However, available neuroimaging findings do not show right hemisphere abnormalities, and she denied significant visuospatial problems day-to-day. Her overall pattern of performance including intact language and memory is not consistent with early onset Alzheimer's disease. This evaluation may serve as a baseline for longitudinal tracking.      Recommendations:    The patient is scheduled for a feedback session to discuss current results and recommendations on 06/26/2025.  Based on the present results, the patient is recommended for a repeat neuropsychological evaluation in 12 to 18  months or sooner if there is a considerable change in cognitive or emotional status. At that time, diagnostic impressions and treatment recommendations will be revised and updated as necessary. Additional testing will permit comparisons over time to better identify stability, potential improvement, or possible decline.  In light of her cognitive deficits, increased oversight and assistance with complex instrumental activities of daily living (e.g., financial and medication/health management) from a family member or other trusted individual are recommended.  Given her cognitive deficits, increased oversight and assistance from family members or trusted others with complex decision-making (e.g., legal, financial, and medical) are recommended.  Regarding work in the future, a diagnosis of mild cognitive impairment (MCI) is typically not a contraindication to engaging in full-time work. Individuals with MCI are usually able to compensate for their mild cognitive difficulties. Regardless, her mild cognitive deficits can potentially interfere with work performance. As such, depending on the complexity of the job task, the patient may need to use compensation strategies and require reasonable accommodations at work. Examples may include extra time for work related tasks, permission to take more frequent breaks, writing notes, using to do lists, permission to record information during meetings, and/or using electronic reminders, but these will vary depending on the specific job task. The Job Accommodation Network (NAVDEEP; https://askjan.org/index.cfm; 603.690.8670) offer helpful resources in this regard.  Considering her report mild anxiety, mood disturbance, and ongoing stressors, if interested, the patient may benefit from the initiation of individual counseling sessions for further assessment and treatment. Evidence-based treatments such as cognitive behavioral therapy, acceptance and commitment therapy, and  mindfulness-based interventions may be particularly beneficial. She preferred to postpone a referral at this time but may reach out to me to place one as needed. The following are options for psychotherapy in the community:  [facility] (https://www.[employer].org/health-services/behavioral-health; 833.778.9176 or 791-665-3593)  Blanca Banda, PhD (https://www.[employer]/; 885.948.6669)  [facility] (https://www.[employer]; 759.801.8359)  [facility] (https://www.HealOr.RentWiki/index.html; 516.599.2537)  [facility] (https://LOVEThESIGN.RentWiki; 108.425.7037)   A directory of providers can also be found on the Psychology Today website (www.psychologytoday.com)  Given her cognitive deficits, if interested, the patient may benefit from engagement in occupational or speech therapy with a cognitive rehabilitation component. She may reach out to me to place a referral as needed and she was added to the waitlist for the cognitive training program in our department.   Given her report of chronic pain and tension headaches that may exacerbate cognitive difficulties, she is encouraged to continue pain and headache management treatment.  Low vitamin D levels have been associated with cognitive inefficiencies day-to-day. As such, she is encouraged to consult with her prescribing physicians regarding possible supplementation or dietary changes.  The patient reported sleep problems and daytime fatigue that may be contributing to her cognitive difficulties. As such, she may benefit from education regarding sleep hygiene and healthy sleep habits, including maintenance of a regular sleep/wake cycle and integrating relaxation exercises before bed. Considering her history of EDWARD, she is encouraged to follow up with sleep medicine as needed. Additional strategies include:  The light emitted by phone screens, TVs, and iPads can mimic daylight and suppress the body's natural release  of melatonin, making it harder to fall asleep. It is recommended to stop screen time about an hour before bed.  Develop a bedtime routine.  Keep the bedroom at a comfortable temperature.  Use low lighting in the evenings.  Avoid consuming large meals and caffeine close to bedtime.  Avoid sleeping during the day, as it can disturb the normal pattern of sleep and wakefulness.  Exercise can promote good sleep, particularly when completed in the morning or early afternoon.  In light of her cognitive deficits, it is recommended that the patient be closely monitored by her family or other trusted individuals for future changes or declines while driving to ensure her safety and that of others on the road. If this becomes an area of concern, it would be advised that she undergo a formal driving evaluation conducted by an occupational therapist with expertise in this area: [facility] (https://www.[facility].org/Health-Services/Physical-Therapy; 611.725.5690). Examples to promote safe driving include having a passenger in car, restricting driving to well-known routes during the day in fair weather with good visibility, limiting external distractions (e.g., radio and cell phone), and avoiding complicated driving situations and highly congested areas.  Continued use of compensatory strategies is recommended to reduce cognitive demands. This may include setting scheduled routines, having an established location for essential items (e.g., wallet, glasses, and keys), completing tasks when there are no time demands, completing one task at a time, minimizing external distractions, paraphrasing and repeating to be learned information, and using external aids for reminders (e.g., planner, calendar, setting alarms, labels, and to-do lists) consistently.   Energy conservation strategies are recommended to manage mental/physical fatigue and daytime somnolence, such as taking scheduled breaks, breaking down  demanding tasks into smaller components, working on projects for shorter periods, maximizing time when she feels the most alert, and working on cognitively demanding projects at her best time of the day.  In light of her medical history, cerebrovascular disease represents a risk factor for future cognitive decline. As such, the patient is encouraged to reduce vascular risk factors (e.g., prediabetes) per her providers' treatment recommendations (e.g., healthy diet, medication adherence, and exercise). The book Undo It!: How Simple Lifestyle Changes Can Reverse Most Chronic Diseases by Alexi Cutler and Kaya Cutler may be a helpful resource for the patient.  Individuals with mild cognitive impairment are at a higher risk of developing dementia in the future. As such, the patient is encouraged to regularly engage in social and physical recreation, as well as cognitively stimulating activities (e.g., puzzles, games, reading, exercise, and social gatherings) to promote brain health and emotional well-being. The book Living with Mild Cognitive Impairment: A Guide to Maximizing Brain Health and Reducing Risk of Dementia by Kailyn Gonzalez may be a helpful resource for the patient. The [facility] [employer] provides diverse educational & social opportunities for adult learners (https://www.olli.[facility]; 972.381.6693).  The books Keep Your Wits About You: The Science of Brain Maintenance as You Age by Kavita Esquivel PhD, Your Memory: How It Works and How to Improve It by Rolan Arevalo, and [employer] Guide to Achieving Optimal Memory by Sebastien Pichardo and Catalina Tay may be helpful resources for the patient and her family.  The patient and her family may benefit from resources available through the Alzheimer's Association (www.alz.org or 8.757.887.0216) and the American Heart Association (https://www.heart.org/ or 9-422-672-6771), which offer psychoeducational information and services for  individuals with mild cognitive impairment and vascular risk factors.  If not already done so, the patient and her family members are encouraged to discuss legal and financial affairs, such as establishing a will/trust and power of , to assist her with future financial and healthcare decisions. Information regarding these issues can be found at www.caringinfo.org or www.agingwithdignity.org/5wishes.html.  The patient's family may benefit from information and resources available through the Family Caregiver [place] (www.caregiver.org) and Caring.com (www.caring.com), which include support groups and respite services.

## 2025-08-28 ENCOUNTER — OFFICE VISIT (OUTPATIENT)
Dept: NEUROLOGY | Facility: MEDICAL CENTER | Age: 60
End: 2025-08-28
Attending: PSYCHIATRY & NEUROLOGY
Payer: COMMERCIAL

## 2025-08-28 VITALS
TEMPERATURE: 96.7 F | OXYGEN SATURATION: 95 % | SYSTOLIC BLOOD PRESSURE: 124 MMHG | WEIGHT: 248.24 LBS | HEIGHT: 63 IN | HEART RATE: 72 BPM | DIASTOLIC BLOOD PRESSURE: 70 MMHG | BODY MASS INDEX: 43.98 KG/M2

## 2025-08-28 DIAGNOSIS — G47.33 OSA ON CPAP: ICD-10-CM

## 2025-08-28 DIAGNOSIS — G31.84 MILD COGNITIVE IMPAIRMENT: Primary | ICD-10-CM

## 2025-08-28 PROBLEM — R73.9 STEROID-INDUCED HYPERGLYCEMIA: Status: ACTIVE | Noted: 2024-08-12

## 2025-08-28 PROBLEM — E78.2 MIXED HYPERLIPIDEMIA: Status: ACTIVE | Noted: 2025-08-28

## 2025-08-28 PROBLEM — F43.10 PTSD (POST-TRAUMATIC STRESS DISORDER): Status: ACTIVE | Noted: 2021-11-08

## 2025-08-28 PROBLEM — T38.0X5A STEROID-INDUCED HYPERGLYCEMIA: Status: ACTIVE | Noted: 2024-08-12

## 2025-08-28 PROBLEM — J96.01 ACUTE RESPIRATORY FAILURE WITH HYPOXIA (HCC): Status: ACTIVE | Noted: 2024-08-11

## 2025-08-28 PROBLEM — E78.00 HYPERCHOLESTEROLEMIA: Status: ACTIVE | Noted: 2021-12-16

## 2025-08-28 PROBLEM — E66.9 OBESITY: Status: ACTIVE | Noted: 2025-08-28

## 2025-08-28 PROBLEM — E11.9 TYPE 2 DIABETES MELLITUS WITHOUT COMPLICATIONS (HCC): Status: ACTIVE | Noted: 2021-11-08

## 2025-08-28 PROCEDURE — 99214 OFFICE O/P EST MOD 30 MIN: CPT

## 2025-08-28 PROCEDURE — 3074F SYST BP LT 130 MM HG: CPT | Performed by: PSYCHIATRY & NEUROLOGY

## 2025-08-28 PROCEDURE — 99215 OFFICE O/P EST HI 40 MIN: CPT | Performed by: PSYCHIATRY & NEUROLOGY

## 2025-08-28 PROCEDURE — 3078F DIAST BP <80 MM HG: CPT | Performed by: PSYCHIATRY & NEUROLOGY

## 2025-08-28 RX ORDER — VENLAFAXINE HYDROCHLORIDE 37.5 MG/1
37.5 CAPSULE, EXTENDED RELEASE ORAL
COMMUNITY

## 2025-08-28 RX ORDER — SEMAGLUTIDE 0.68 MG/ML
0.25 INJECTION, SOLUTION SUBCUTANEOUS
COMMUNITY

## 2025-08-28 RX ORDER — OMEPRAZOLE 20 MG/1
20 CAPSULE, DELAYED RELEASE ORAL DAILY
COMMUNITY

## 2025-08-28 RX ORDER — AZELASTINE 1 MG/ML
1 SPRAY, METERED NASAL 2 TIMES DAILY
COMMUNITY

## 2025-08-28 RX ORDER — MEPOLIZUMAB 40 MG/.4ML
INJECTION, SOLUTION SUBCUTANEOUS
COMMUNITY

## 2025-08-28 RX ORDER — LAMOTRIGINE 150 MG/1
150 TABLET ORAL 2 TIMES DAILY
COMMUNITY

## 2025-08-28 RX ORDER — ATORVASTATIN CALCIUM 40 MG/1
40 TABLET, FILM COATED ORAL
COMMUNITY

## 2025-08-28 RX ORDER — MONTELUKAST SODIUM 10 MG/1
10 TABLET ORAL
COMMUNITY

## 2025-08-28 RX ORDER — ROSUVASTATIN CALCIUM 10 MG/1
10 TABLET, COATED ORAL DAILY
COMMUNITY

## 2025-08-28 RX ORDER — FLUTICASONE FUROATE, UMECLIDINIUM BROMIDE AND VILANTEROL TRIFENATATE 200; 62.5; 25 UG/1; UG/1; UG/1
1 POWDER RESPIRATORY (INHALATION) DAILY
COMMUNITY
Start: 2025-08-06

## 2025-08-28 ASSESSMENT — LIFESTYLE VARIABLES
SKIP TO QUESTIONS 9-10: 1
HOW MANY STANDARD DRINKS CONTAINING ALCOHOL DO YOU HAVE ON A TYPICAL DAY: 1 OR 2
HOW OFTEN DO YOU HAVE SIX OR MORE DRINKS ON ONE OCCASION: NEVER
HOW OFTEN DO YOU HAVE A DRINK CONTAINING ALCOHOL: MONTHLY OR LESS
AUDIT-C TOTAL SCORE: 1

## 2025-08-28 ASSESSMENT — PATIENT HEALTH QUESTIONNAIRE - PHQ9: CLINICAL INTERPRETATION OF PHQ2 SCORE: 0

## 2025-08-28 ASSESSMENT — FIBROSIS 4 INDEX: FIB4 SCORE: 0.94

## 2025-08-28 NOTE — PATIENT INSTRUCTIONS
Thank you for your effort during today's evaluation. We will have a feedback session to discuss your results on 06/26/2025 at 1:30 PM.